# Patient Record
Sex: FEMALE | Race: WHITE | URBAN - METROPOLITAN AREA
[De-identification: names, ages, dates, MRNs, and addresses within clinical notes are randomized per-mention and may not be internally consistent; named-entity substitution may affect disease eponyms.]

---

## 2021-06-07 ENCOUNTER — OFFICE VISIT (OUTPATIENT)
Dept: URBAN - METROPOLITAN AREA CLINIC 52 | Facility: CLINIC | Age: 66
End: 2021-06-07
Payer: MEDICARE

## 2021-06-07 DIAGNOSIS — H35.362 DRUSEN OF MACULA OF LEFT EYE: ICD-10-CM

## 2021-06-07 PROCEDURE — 92004 COMPRE OPH EXAM NEW PT 1/>: CPT | Performed by: OPHTHALMOLOGY

## 2021-06-07 ASSESSMENT — INTRAOCULAR PRESSURE
OS: 15
OD: 16
OS: 14

## 2021-06-07 ASSESSMENT — VISUAL ACUITY
OD: 20/25
OS: 20/100

## 2021-06-07 NOTE — IMPRESSION/PLAN
Impression: Combined forms of age-related cataract, bilateral: H25.813. Plan: OK for CEIOL OS in [address], [address]2. AIM FAR. Discussed lens options, Standard or Toric - pending THANG. Pt is not a candidate for MF lens. Discussed need for glasses for near vision with standard. Discussed diagnosis of cataracts. Cataracts are limiting vision. Discussed risks, benefits and alternatives to surgery including but not limited to: bleeding, infection, risk of vision loss, loss of the eye, need for other surgery. Patient voiced understanding and wishes to proceed. Recommend THANG. Discussed doing Dexycu at the time of surgery. Monitor right eye cataract for now.

## 2021-06-07 NOTE — IMPRESSION/PLAN
Impression: Drusen of macula of left eye: H35.362. Plan: Discussed diagnosis in detail with patient. No treatment is required at this time. Will continue to observe condition and or symptoms.

## 2021-06-23 ENCOUNTER — PRE-OPERATIVE VISIT (OUTPATIENT)
Dept: URBAN - METROPOLITAN AREA CLINIC 52 | Facility: CLINIC | Age: 66
End: 2021-06-23
Payer: MEDICARE

## 2021-06-23 PROCEDURE — 92025 CPTRIZED CORNEAL TOPOGRAPHY: CPT | Performed by: OPHTHALMOLOGY

## 2021-06-23 ASSESSMENT — PACHYMETRY
OS: 22.95
OD: 22.81
OS: 3.23
OD: 2.96

## 2021-06-23 ASSESSMENT — VISUAL ACUITY: OS: 20/100

## 2021-06-28 ENCOUNTER — PRE-OPERATIVE VISIT (OUTPATIENT)
Dept: URBAN - METROPOLITAN AREA CLINIC 52 | Facility: CLINIC | Age: 66
End: 2021-06-28
Payer: MEDICARE

## 2021-06-28 DIAGNOSIS — H25.813 COMBINED FORMS OF AGE-RELATED CATARACT, BILATERAL: Primary | ICD-10-CM

## 2021-06-28 PROCEDURE — 99213 OFFICE O/P EST LOW 20 MIN: CPT | Performed by: OPHTHALMOLOGY

## 2021-06-28 ASSESSMENT — VISUAL ACUITY: OS: 20/100

## 2021-06-28 ASSESSMENT — INTRAOCULAR PRESSURE
OS: 12
OD: 12

## 2021-06-28 NOTE — IMPRESSION/PLAN
Impression: Combined forms of age-related cataract, bilateral: H25.813. Plan: Ok for CEIOL OS in [address], [address]. AIM FAR. Cataracts are limiting vision. Discussed risks, benefits and alternatives to surgery including but not limited to: bleeding, infection, risk of vision loss, loss of the eye, need for other surgery. Patient voiced understanding and wishes to proceed. Discussed doing Dexycu at the time of surgery.  Recommend SA60WF +20.5

## 2021-07-06 ENCOUNTER — SURGERY (OUTPATIENT)
Dept: URBAN - METROPOLITAN AREA SURGERY 20 | Facility: SURGERY | Age: 66
End: 2021-07-06
Payer: MEDICARE

## 2021-07-06 PROCEDURE — 66984 XCAPSL CTRC RMVL W/O ECP: CPT | Performed by: OPHTHALMOLOGY

## 2021-07-07 ENCOUNTER — POST-OPERATIVE VISIT (OUTPATIENT)
Dept: URBAN - METROPOLITAN AREA CLINIC 45 | Facility: CLINIC | Age: 66
End: 2021-07-07
Payer: MEDICARE

## 2021-07-07 PROCEDURE — 99024 POSTOP FOLLOW-UP VISIT: CPT | Performed by: OPTOMETRIST

## 2021-07-07 ASSESSMENT — INTRAOCULAR PRESSURE
OS: 15
OD: 11

## 2021-07-07 NOTE — IMPRESSION/PLAN
Impression: S/P Cataract Extraction by phacoemulsification with IOL placement; DEXYCU OS - 1 Day. Encounter for surgical aftercare following surgery on a sense organ  Z48.810. Excellent post op course   Post operative instructions reviewed - Plan: --Advised patient to use artificial tears for comfort.

## 2021-07-15 ENCOUNTER — POST-OPERATIVE VISIT (OUTPATIENT)
Dept: URBAN - METROPOLITAN AREA CLINIC 52 | Facility: CLINIC | Age: 66
End: 2021-07-15
Payer: MEDICARE

## 2021-07-15 DIAGNOSIS — Z48.810 ENCOUNTER FOR SURGICAL AFTERCARE FOLLOWING SURGERY ON A SENSE ORGAN: Primary | ICD-10-CM

## 2021-07-15 PROCEDURE — 99024 POSTOP FOLLOW-UP VISIT: CPT | Performed by: OPHTHALMOLOGY

## 2021-07-15 ASSESSMENT — INTRAOCULAR PRESSURE
OD: 12
OS: 15

## 2021-07-15 NOTE — IMPRESSION/PLAN
Impression: S/P Cataract Extraction by phacoemulsification with IOL placement; DEXYCU OS - 9 Days. Encounter for surgical aftercare following surgery on a sense organ  Z48.810. Excellent post op course   Post operative instructions reviewed - Condition is improving - Cataract OD. Plan: Patient is healing well. Patient to call us sooner with any questions or concerns. Monitor yearly. Patient to call us sooner, onset of new visual changes. --Advised patient to use artificial tears for comfort.

## 2022-01-17 ENCOUNTER — OFFICE VISIT (OUTPATIENT)
Dept: URBAN - METROPOLITAN AREA CLINIC 52 | Facility: CLINIC | Age: 67
End: 2022-01-17
Payer: MEDICARE

## 2022-01-17 PROCEDURE — 99214 OFFICE O/P EST MOD 30 MIN: CPT | Performed by: OPHTHALMOLOGY

## 2022-01-17 RX ORDER — BROMFENAC SODIUM 0.7 MG/ML
0.07 % SOLUTION/ DROPS OPHTHALMIC
Qty: 5 | Refills: 1 | Status: ACTIVE
Start: 2022-01-17

## 2022-01-17 ASSESSMENT — INTRAOCULAR PRESSURE
OS: 12
OD: 13

## 2022-01-17 ASSESSMENT — VISUAL ACUITY: OD: 20/50

## 2022-01-17 NOTE — IMPRESSION/PLAN
Impression: Combined forms of age-related cataract, right eye: H25.811. Plan: Ok for CEIOL OD in [address], [address]. AIM FAR. Cataracts are limiting vision. Discussed risks, benefits and alternatives to surgery including but not limited to: bleeding, infection, risk of vision loss, loss of the eye, need for other surgery. Patient voiced understanding and wishes to proceed. Discussed doing Dexycu at the time of surgery. Will have patient start Prolensa QD x 3 weeks, starting one day prior to surgery - ERx'd into the pharmacy. Recommend SA60WF +21.0. 
*PATIENT DOES NOT NEED A PRE-OP OR REPEAT ASCAN*

## 2022-02-08 ENCOUNTER — SURGERY (OUTPATIENT)
Dept: URBAN - METROPOLITAN AREA SURGERY 20 | Facility: SURGERY | Age: 67
End: 2022-02-08
Payer: MEDICARE

## 2022-02-08 DIAGNOSIS — H25.811 COMBINED FORMS OF AGE-RELATED CATARACT, RIGHT EYE: Primary | ICD-10-CM

## 2022-02-08 PROCEDURE — 66984 XCAPSL CTRC RMVL W/O ECP: CPT | Performed by: OPHTHALMOLOGY

## 2022-02-09 ENCOUNTER — POST-OPERATIVE VISIT (OUTPATIENT)
Dept: URBAN - METROPOLITAN AREA CLINIC 52 | Facility: CLINIC | Age: 67
End: 2022-02-09
Payer: MEDICARE

## 2022-02-09 PROCEDURE — 99024 POSTOP FOLLOW-UP VISIT: CPT | Performed by: OPTOMETRIST

## 2022-02-09 ASSESSMENT — INTRAOCULAR PRESSURE
OD: 16
OS: 12

## 2022-02-09 NOTE — IMPRESSION/PLAN
Impression:  Presence of intraocular lens  Z96.1. Post operative instructions reviewed - Plan: Discussed findings with patient and re-educated on PO instructions. Instructed patient to call if any significant pain, loss of vision, flashes or new floaters. --Advised patient to use artificial tears for comfort.

## 2022-02-18 ENCOUNTER — POST-OPERATIVE VISIT (OUTPATIENT)
Dept: URBAN - METROPOLITAN AREA CLINIC 52 | Facility: CLINIC | Age: 67
End: 2022-02-18
Payer: MEDICARE

## 2022-02-18 DIAGNOSIS — Z96.1 PRESENCE OF INTRAOCULAR LENS: Primary | ICD-10-CM

## 2022-02-18 PROCEDURE — 99024 POSTOP FOLLOW-UP VISIT: CPT | Performed by: OPTOMETRIST

## 2022-02-18 ASSESSMENT — INTRAOCULAR PRESSURE
OS: 12
OD: 14

## 2022-02-18 NOTE — IMPRESSION/PLAN
Impression: S/P Cataract Extraction by phacoemulsification with IOL placement; DEXYCU OD - 10 Days. Presence of intraocular lens  Z96.1. Post operative instructions reviewed - Plan: Discussed exam findings, continue to monitor annually. Patient to call if any flashes, new floaters, or vision loss are experienced. --Advised patient to use artificial tears for comfort.

## 2022-03-25 ENCOUNTER — OFFICE VISIT (OUTPATIENT)
Dept: URBAN - METROPOLITAN AREA CLINIC 52 | Facility: CLINIC | Age: 67
End: 2022-03-25
Payer: COMMERCIAL

## 2022-03-25 DIAGNOSIS — H52.4 PRESBYOPIA: Primary | ICD-10-CM

## 2022-03-25 PROCEDURE — 92012 INTRM OPH EXAM EST PATIENT: CPT | Performed by: OPTOMETRIST

## 2022-03-25 ASSESSMENT — INTRAOCULAR PRESSURE
OS: 12
OD: 15

## 2022-03-25 ASSESSMENT — VISUAL ACUITY
OS: 20/20
OD: 20/20